# Patient Record
Sex: FEMALE | Race: WHITE | NOT HISPANIC OR LATINO | Employment: FULL TIME | URBAN - METROPOLITAN AREA
[De-identification: names, ages, dates, MRNs, and addresses within clinical notes are randomized per-mention and may not be internally consistent; named-entity substitution may affect disease eponyms.]

---

## 2017-08-14 ENCOUNTER — DOCUMENTATION (OUTPATIENT)
Dept: BARIATRICS/WEIGHT MGMT | Facility: CLINIC | Age: 37
End: 2017-08-14

## 2017-08-14 DIAGNOSIS — R06.00 DYSPNEA, UNSPECIFIED TYPE: Primary | ICD-10-CM

## 2017-08-14 DIAGNOSIS — R10.13 DYSPEPSIA AND DISORDER OF FUNCTION OF STOMACH: ICD-10-CM

## 2017-08-14 DIAGNOSIS — K31.9 DYSPEPSIA AND DISORDER OF FUNCTION OF STOMACH: ICD-10-CM

## 2017-08-14 DIAGNOSIS — R10.13 DYSPEPSIA: ICD-10-CM

## 2017-08-14 DIAGNOSIS — R53.83 FATIGUE, UNSPECIFIED TYPE: ICD-10-CM

## 2017-08-14 RX ORDER — IBUPROFEN 800 MG/1
800 TABLET ORAL EVERY 6 HOURS PRN
COMMUNITY
End: 2018-11-29

## 2017-08-14 RX ORDER — CETIRIZINE HYDROCHLORIDE 10 MG/1
10 TABLET ORAL DAILY
COMMUNITY
End: 2018-11-06

## 2017-08-14 RX ORDER — VENLAFAXINE HYDROCHLORIDE 75 MG/1
75 CAPSULE, EXTENDED RELEASE ORAL DAILY
COMMUNITY
End: 2018-11-06

## 2017-08-14 RX ORDER — OMEPRAZOLE 40 MG/1
20 CAPSULE, DELAYED RELEASE ORAL DAILY
COMMUNITY
End: 2017-09-19 | Stop reason: DRUGHIGH

## 2017-09-19 ENCOUNTER — DOCUMENTATION (OUTPATIENT)
Dept: BARIATRICS/WEIGHT MGMT | Facility: CLINIC | Age: 37
End: 2017-09-19

## 2017-09-19 ENCOUNTER — OFFICE VISIT (OUTPATIENT)
Dept: BARIATRICS/WEIGHT MGMT | Facility: CLINIC | Age: 37
End: 2017-09-19

## 2017-09-19 VITALS
RESPIRATION RATE: 18 BRPM | BODY MASS INDEX: 47.09 KG/M2 | SYSTOLIC BLOOD PRESSURE: 128 MMHG | TEMPERATURE: 97.9 F | WEIGHT: 293 LBS | DIASTOLIC BLOOD PRESSURE: 82 MMHG | OXYGEN SATURATION: 99 % | HEIGHT: 66 IN | HEART RATE: 82 BPM

## 2017-09-19 DIAGNOSIS — R53.83 FATIGUE, UNSPECIFIED TYPE: ICD-10-CM

## 2017-09-19 DIAGNOSIS — R06.00 DYSPNEA, UNSPECIFIED TYPE: ICD-10-CM

## 2017-09-19 DIAGNOSIS — R06.09 DYSPNEA ON EXERTION: ICD-10-CM

## 2017-09-19 DIAGNOSIS — R10.13 DYSPEPSIA: ICD-10-CM

## 2017-09-19 DIAGNOSIS — E66.01 MORBID OBESITY WITH BMI OF 50.0-59.9, ADULT (HCC): ICD-10-CM

## 2017-09-19 DIAGNOSIS — K21.9 GASTROESOPHAGEAL REFLUX DISEASE, ESOPHAGITIS PRESENCE NOT SPECIFIED: ICD-10-CM

## 2017-09-19 DIAGNOSIS — G47.33 OBSTRUCTIVE SLEEP APNEA SYNDROME: Primary | ICD-10-CM

## 2017-09-19 PROCEDURE — 99205 OFFICE O/P NEW HI 60 MIN: CPT | Performed by: PHYSICIAN ASSISTANT

## 2017-09-19 RX ORDER — SODIUM CHLORIDE 9 MG/ML
150 INJECTION, SOLUTION INTRAVENOUS CONTINUOUS
Status: CANCELLED | OUTPATIENT
Start: 2017-09-19

## 2017-09-19 RX ORDER — OMEPRAZOLE 20 MG/1
20 CAPSULE, DELAYED RELEASE ORAL DAILY
COMMUNITY

## 2017-09-19 NOTE — PROGRESS NOTES
[facility] GROUP BARIATRIC SURGERY  [address]  [facility] [address]  [phone]      Patient  Name:  Mayuri Mittal.  :  1980      Date of Visit: 2017      Chief Complaint:  weight gain; unable to maintain weight loss    History of Present Illness:  Mayuri Mittal is a 37 y.o. female who presents today for evaluation, education and consultation regarding weight loss surgery. The patient is interested in sleeve gastrectomy.     Mayuri has been overweight for at least 20 years, has been 35 pounds or more overweight for at least 20 years, has been 100 pounds or more overweight for 17 or more years and started dieting at age 20.      Previous diet attempts include: Low Fat, Calorie Counting, Gisel's Diet, Cabbage Soup, Fasting and Slim Fast.  The most weight Mayuri lost was 30 pounds on Atkins but was only able to maintain that weight loss for 2 months.  Her maximum lifetime weight is 312 pounds.    As above, patient has been overweight for many years, with numerous failed dietary/weight loss attempts.  She now has obesity related comorbidities and as such has decided to pursue weight loss surgery.      Past Medical History:   Diagnosis Date   • Anxiety    • Chronic back pain     prn NSAIDs   • Depression    • Dyspepsia    • Dyspnea on exertion    • Fatigue    • Former smoker     quit    • GERD (gastroesophageal reflux disease)     daily Prilosec   • Joint pain    • Morbid obesity    • Sleep apnea     CPAP advised, semi-compliant     Past Surgical History:   Procedure Laterality Date   •  SECTION  ,    • COLONOSCOPY      to evaluate rectal bleeding, hemorrhoids, o/w unremarkable   • LAPAROSCOPIC CHOLECYSTECTOMY      1 month postpartum for stones, no issues   • LAPAROSCOPIC HYSTERECTOMY      w/ unilateral URSULA, for dysmenorrhea   • MOUTH SURGERY      upper teeth extracted    • WISDOM TOOTH EXTRACTION         No Known  Allergies      Current Outpatient Prescriptions:   •  cetirizine (zyrTEC) 10 MG tablet, Take 10 mg by mouth Daily., Disp: , Rfl:   •  ibuprofen (ADVIL,MOTRIN) 800 MG tablet, Take 800 mg by mouth Every 6 (Six) Hours As Needed for Mild Pain (1-3)., Disp: , Rfl:   •  omeprazole (priLOSEC) 20 MG capsule, Take 20 mg by mouth Daily., Disp: , Rfl:   •  venlafaxine XR (EFFEXOR-XR) 75 MG 24 hr capsule, Take 75 mg by mouth Daily., Disp: , Rfl:     Social History     Social History   • Marital status:      Spouse name: N/A   • Number of children: N/A   • Years of education: N/A     Occupational History   • Not on file.     Social History Main Topics   • Smoking status: Former Smoker     Packs/day: 1.00     Years: 5.00     Quit date: 11/2007   • Smokeless tobacco: Never Used   • Alcohol use No   • Drug use: No   • Sexual activity: Defer     Other Topics Concern   • Not on file     Social History Narrative    Lives in [place], [place] w/ 3 kids.  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     Family History   Problem Relation Age of Onset   • Hypertension Mother    • Sleep apnea Mother    • Obesity Brother    • Diabetes Brother    • Hypertension Brother    • Obesity Maternal Grandmother    • Diabetes Maternal Grandmother    • Hypertension Maternal Grandmother    • Stroke Maternal Grandmother    • Hypertension Maternal Grandfather    • Stroke Paternal Grandmother    • Diabetes Paternal Grandmother    • Hypertension Paternal Grandmother    • Obesity Paternal Grandmother    • Heart disease Paternal Grandmother    • Cancer Paternal Grandfather      bladder       Review of Systems:  Constitutional:  The patient reports fatigue, weight gain and denies fevers and chills.  Cardiovascular:  The patient denies HTN, heart disease and DVT.  Respiratory:  The patient denies asthma and PE.  Gastrointestinal:  The patient reports heartburn and denies liver disease.  Genitourinary:  The patient denies renal insufficiency.    Musculoskeletal:  The patient  reports joint pain and denies fibromyalgia.  Neurological:  The patient denies seizure and stroke.  Psychiatric:  The patient reports anxiety, depression and denies bipolar disorder.  Endocrine:  The patient denies diabetes and thyroid disease.  Hematologic:  The patient denies anemia and bleeding disorder.  Skin:  The patient denies MRSA.    Physical Exam:  Vital Signs:  Weight: (!) 312 lb 8 oz (142 kg)   Body mass index is 50.44 kg/(m^2).  Temp: 97.9 °F (36.6 °C)   Heart Rate: 82   BP: 128/82     Physical Exam   Constitutional: She is oriented to person, place, and time. She appears well-developed and well-nourished.   HENT:   Head: Normocephalic and atraumatic.   Eyes: Conjunctivae are normal. No scleral icterus.   Neck: Neck supple. No thyromegaly present.   Cardiovascular: Normal rate and regular rhythm.    No murmur heard.  Pulmonary/Chest: Effort normal and breath sounds normal. No respiratory distress. She has no wheezes. She has no rales.   Abdominal: Soft. Bowel sounds are normal. She exhibits no distension and no mass. There is no tenderness. No hernia.   scars:  lap lizz, periumbilical x 2, lap hysterectomy   Musculoskeletal: Normal range of motion. She exhibits no edema.   Neurological: She is alert and oriented to person, place, and time. Gait normal.   Skin: Skin is warm and dry. No rash noted.   Psychiatric: She has a normal mood and affect. Judgment normal.   Vitals reviewed.       Patient Active Problem List   Diagnosis   • Fatigue   • Morbid obesity   • Dyspepsia   • Dyspnea on exertion   • GERD (gastroesophageal reflux disease)   • Depression   • Anxiety   • Joint pain   • Chronic back pain   • Former smoker   • Sleep apnea     Assessment:    Mayuri Mittal is a 37 y.o. year old female with medically complicated obesity pursuing sleeve gastrectomy.    Weight loss surgery is deemed medically necessary given the following obesity related comorbidities including sleep apnea, back pain, knee pain  and GERD with current Weight: (!) 312 lb 8 oz (142 kg) and Body mass index is 50.44 kg/(m^2)..    Plan:  The consultation plan and program requirements were reviewed with the patient.  The patient has been advised that a letter of medical support must be obtained from her primary care physician or referring provider. A psychological evaluation will be arranged.  A nutritional evaluation will be performed.  The patient was advised to start a high protein and low carbohydrate diet.  Necessary lifestyle modifications were discussed.  Instructions on how to access ALBA was given to the patient.  ALBA is an internet based educational video that explains the surgical procedure chosen and answers basic questions regarding that procedure.     Preoperative testing will include: CBC, CMP, Fasting Lipids, TSH, H.Pylori, Pulmonary Function Testing, CXR, EKG and EGD     Additional preop clearances required prior to surgery: Cardiac.      Patient understands that bariatric surgery is not cosmetic surgery but rather a tool to help make a lifelong commitment to lifestyle changes including diet, exercise, behavior modifications, and healthy habits.      ARSENIO Kang

## 2017-09-19 NOTE — PROGRESS NOTES
"Weight Loss Surgery  Presurgical Nutrition Assessment     Mayuri Mittal  2017  [id number]  [id number]  1980  female    Surgery desired: Sleeve Gastrectomy    Ht 66\" (167.6 cm); Wt 312.5 # (142 kg); BMI 50.4  Past Medical History:   Diagnosis Date   • Anxiety    • Chronic back pain     prn NSAIDs   • Depression    • Dyspepsia    • Dyspnea on exertion    • Fatigue    • Former smoker     quit    • GERD (gastroesophageal reflux disease)     daily Prilosec   • Joint pain    • Morbid obesity    • Sleep apnea     CPAP advised, semi-compliant     Past Surgical History:   Procedure Laterality Date   •  SECTION  ,    • COLONOSCOPY      to evaluate rectal bleeding, hemorrhoids, o/w unremarkable   • LAPAROSCOPIC CHOLECYSTECTOMY      1 month postpartum for stones, no issues   • LAPAROSCOPIC HYSTERECTOMY      w/ unilateral URSULA, for dysmenorrhea   • MOUTH SURGERY      upper teeth extracted    • WISDOM TOOTH EXTRACTION       No Known Allergies    Current Outpatient Prescriptions:   •  cetirizine (zyrTEC) 10 MG tablet, Take 10 mg by mouth Daily., Disp: , Rfl:   •  ibuprofen (ADVIL,MOTRIN) 800 MG tablet, Take 800 mg by mouth Every 6 (Six) Hours As Needed for Mild Pain (1-3)., Disp: , Rfl:   •  omeprazole (priLOSEC) 20 MG capsule, Take 20 mg by mouth Daily., Disp: , Rfl:   •  venlafaxine XR (EFFEXOR-XR) 75 MG 24 hr capsule, Take 75 mg by mouth Daily., Disp: , Rfl:       Nutrition Assessment    Estimated energy needs:  2125 kcal    Estimated calories for weight loss:  1500 kcal    IBW (Pounds):  155 #        Excess body weight (Pounds):  160 #       Nutrition Recall  24 Hour recall: (B) (L) (D) -  Reviewed and discussed with patient.  Coffee intake very high - three large coffee mugs (est. by pt to be = to 6-8 oz cups) each morning.  Sometimes is not hungry and has coffee only, other times also has bran flakes and milk also.  No lunch. At 3:30 to 4:30 may have a snack such as a " "brownie or another bowl of cereal.  On day record was kept, was very hungry by mid-afternoon and had 5-6 cups bran.  Supper at 7:30-8 was 4 cups of mac & cheese /c hamburger mixed in.  Lastly, she consumed an entire pint of chocolate ice cream at 10 pm.  No soda or sweet tea. Pt stated she was \"so hungry\" by 4:30 pm.  Noted and discussed lack of protein at meals throughout the day as well as large serving sizes.       Exercise  Joined a gym recently - trying to go 3Xs per wk for 30-60 minutes per time.      Education    Provided information packet re. Sleeve Gastrectomy; . Reviewed principles of healthy low carb high protein eating for wt mgt - to be used now until pre-surgery and also /p recovery from surgery.  Explained need for liquid protein supplement or protein powder for /p surgery liquid diet.  Provided resource for choosing preferred one.  Discussed goal setting for improving eating behaviors.    Recommend that team proceed with surgery and follow per protocol.      Nutrition Goals   Dietary Guidelines per information, packet, as described above.  Protein goal:  grams per day   Carb goal: 100-140 grams per day     Exercise Goals  Continue current exercise routine and regularly go to gym as planned.  Add 15-30 minutes of activity per day as tolerated      Belem Roman, SVEN  09/19/2017  3:14 PM  "

## 2017-10-05 ENCOUNTER — APPOINTMENT (OUTPATIENT)
Dept: PREADMISSION TESTING | Facility: HOSPITAL | Age: 37
End: 2017-10-05

## 2017-10-16 ENCOUNTER — APPOINTMENT (OUTPATIENT)
Dept: PREADMISSION TESTING | Facility: HOSPITAL | Age: 37
End: 2017-10-16

## 2018-11-06 ENCOUNTER — DOCUMENTATION (OUTPATIENT)
Dept: BARIATRICS/WEIGHT MGMT | Facility: CLINIC | Age: 38
End: 2018-11-06

## 2018-11-06 ENCOUNTER — OFFICE VISIT (OUTPATIENT)
Dept: BARIATRICS/WEIGHT MGMT | Facility: CLINIC | Age: 38
End: 2018-11-06

## 2018-11-06 ENCOUNTER — OFFICE VISIT (OUTPATIENT)
Dept: PSYCHIATRY | Facility: CLINIC | Age: 38
End: 2018-11-06

## 2018-11-06 VITALS
HEIGHT: 66 IN | BODY MASS INDEX: 47.09 KG/M2 | TEMPERATURE: 97.8 F | WEIGHT: 293 LBS | DIASTOLIC BLOOD PRESSURE: 96 MMHG | RESPIRATION RATE: 18 BRPM | SYSTOLIC BLOOD PRESSURE: 148 MMHG | HEART RATE: 76 BPM | OXYGEN SATURATION: 99 %

## 2018-11-06 DIAGNOSIS — M72.2 PLANTAR FASCIITIS: ICD-10-CM

## 2018-11-06 DIAGNOSIS — E66.01 MORBID OBESITY (HCC): ICD-10-CM

## 2018-11-06 DIAGNOSIS — R06.09 DYSPNEA ON EXERTION: ICD-10-CM

## 2018-11-06 DIAGNOSIS — F41.9 ANXIETY: ICD-10-CM

## 2018-11-06 DIAGNOSIS — M25.50 ARTHRALGIA, UNSPECIFIED JOINT: ICD-10-CM

## 2018-11-06 DIAGNOSIS — R60.0 LOWER EXTREMITY EDEMA: ICD-10-CM

## 2018-11-06 DIAGNOSIS — R53.83 FATIGUE, UNSPECIFIED TYPE: ICD-10-CM

## 2018-11-06 DIAGNOSIS — F41.9 ANXIETY: Primary | ICD-10-CM

## 2018-11-06 DIAGNOSIS — R03.0 ELEVATED BP WITHOUT DIAGNOSIS OF HYPERTENSION: ICD-10-CM

## 2018-11-06 DIAGNOSIS — J30.2 SEASONAL ALLERGIES: ICD-10-CM

## 2018-11-06 DIAGNOSIS — R00.2 PALPITATIONS: Primary | ICD-10-CM

## 2018-11-06 DIAGNOSIS — Z87.891 FORMER SMOKER: ICD-10-CM

## 2018-11-06 DIAGNOSIS — G47.30 SLEEP APNEA, UNSPECIFIED TYPE: ICD-10-CM

## 2018-11-06 DIAGNOSIS — F32.A DEPRESSION, UNSPECIFIED DEPRESSION TYPE: ICD-10-CM

## 2018-11-06 DIAGNOSIS — Z90.49 S/P CHOLECYSTECTOMY: ICD-10-CM

## 2018-11-06 DIAGNOSIS — K21.9 GASTROESOPHAGEAL REFLUX DISEASE, ESOPHAGITIS PRESENCE NOT SPECIFIED: ICD-10-CM

## 2018-11-06 PROCEDURE — 90791 PSYCH DIAGNOSTIC EVALUATION: CPT | Performed by: PSYCHOLOGIST

## 2018-11-06 PROCEDURE — 99214 OFFICE O/P EST MOD 30 MIN: CPT | Performed by: PHYSICIAN ASSISTANT

## 2018-11-06 RX ORDER — DESVENLAFAXINE SUCCINATE 50 MG/1
50 TABLET, EXTENDED RELEASE ORAL DAILY
COMMUNITY

## 2018-11-06 RX ORDER — SODIUM CHLORIDE 9 MG/ML
100 INJECTION, SOLUTION INTRAVENOUS CONTINUOUS
Status: CANCELLED | OUTPATIENT
Start: 2018-11-06

## 2018-11-06 RX ORDER — LORATADINE 10 MG/1
10 TABLET ORAL DAILY
COMMUNITY

## 2018-11-06 RX ORDER — TRAZODONE HYDROCHLORIDE 50 MG/1
50 TABLET ORAL AS NEEDED
COMMUNITY

## 2018-11-06 NOTE — PROGRESS NOTES
[facility] BARIATRIC SURGERY  [address]  [facility] [address]  [phone]      Patient  Name:  Mayuri Mittal  :  1980      Date of Visit: 2018      Chief Complaint:  weight gain; unable to maintain weight loss    History of Present Illness:  Mayuri Mittal is a 38 y.o. female who presents today for evaluation, education and consultation regarding weight loss surgery. The patient is interested in sleeve gastrectomy.     Mayuri has been overweight for at least 20 years, has been 35 pounds or more overweight for at least 20 years, has been 100 pounds or more overweight for 17 or more years and started dieting at age 20.      Previous diet attempts include: Low Fat, Calorie Counting, Gisel's Diet, Cabbage Soup, Fasting, Slim Fast and 21 day fix; None; None.  The most weight Mayuri lost was 30 pounds on atkins but was only able to maintain that weight loss for 2 months.  Her maximum lifetime weight is 335 pounds.    As above, patient has been overweight for many years, with numerous failed dietary/weight loss attempts.  She now has obesity related comorbidities and as such has decided to pursue weight loss surgery.    Brother had LSG with Dr. Bergeron, has done well.   Pursuing WLS to improve overall health, feel better.     GI: daily Prilosec 20mg daily controls symptoms well.   No h/o EGD, h pylori or HH.    S/p lizz 2/2 cholelithiasis. No other GI complaints.     MSK: plantar fasciitis very bothersone at times, ibuprofen prn. Chronic back pain, knees- prn NSAIDS, no injections.    All past medical, surgical, social and family history have been obtained and discussed as pertinent to bariatric surgery as below.     Past Medical History:   Diagnosis Date   • Anxiety    • Chronic back pain     prn NSAIDs   • Depression    • Depression    • Dyspnea on exertion    • Elevated BP without diagnosis of hypertension    • Fatigue    • Former smoker     quit    •  GERD (gastroesophageal reflux disease)     daily Prilosec 20mg daily controls symptoms well.   No h/o EGD, h pylori or HH.       • Joint pain     worse with activity, Feet, knees, lower back. Doing stretches which help. Ibuprofen prn.    • Lower extremity edema    • Morbid obesity (CMS/HCC)    • Palpitations    • Plantar fasciitis     very bothersone at time, ibuprofen prn.    • Rosacea    • S/P cholecystectomy     cholelithiasis   • Seasonal allergies    • Sleep apnea     CPAP advised, semi-compliant- had issues wtih mask breaking out skin, had to get new mask.       Past Surgical History:   Procedure Laterality Date   •  SECTION  ,     transverse   • COLONOSCOPY      to evaluate rectal bleeding, hemorrhoids, o/w unremarkable   • LAPAROSCOPIC CHOLECYSTECTOMY      1 month postpartum for stones, no issues   • LAPAROSCOPIC HYSTERECTOMY      w/ unilateral URSULA, for dysmenorrhea   • LIPOMA EXCISION Left     LUE   • MOUTH SURGERY      upper teeth extracted    • WISDOM TOOTH EXTRACTION         No Known Allergies    Current Outpatient Prescriptions:   •  desvenlafaxine (PRISTIQ) 50 MG 24 hr tablet, Take 50 mg by mouth Daily., Disp: , Rfl:   •  ibuprofen (ADVIL,MOTRIN) 800 MG tablet, Take 800 mg by mouth Every 6 (Six) Hours As Needed for Mild Pain (1-3)., Disp: , Rfl:   •  loratadine (CLARITIN) 10 MG tablet, Take 10 mg by mouth Daily., Disp: , Rfl:   •  metroNIDAZOLE (METROCREAM) 0.75 % cream, Apply  topically to the appropriate area as directed 2 (Two) Times a Day., Disp: , Rfl:   •  omeprazole (priLOSEC) 20 MG capsule, Take 20 mg by mouth Daily., Disp: , Rfl:   •  traZODone (DESYREL) 50 MG tablet, Take 50 mg by mouth Every Night., Disp: , Rfl:     Social History     Social History   • Marital status:      Spouse name: N/A   • Number of children: N/A   • Years of education: N/A     Occupational History   • Not on file.     Social History Main Topics   • Smoking status: Former  Smoker     Packs/day: 1.00     Years: 5.00     Quit date: 11/2007   • Smokeless tobacco: Never Used   • Alcohol use No   • Drug use: No   • Sexual activity: Defer      Comment: no hormones      Other Topics Concern   • Not on file     Social History Narrative    Lives in [place] w/ 3 kids.  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       Family History   Problem Relation Age of Onset   • Hypertension Mother    • Sleep apnea Mother    • Obesity Brother    • Diabetes Brother    • Hypertension Brother    • Obesity Maternal Grandmother    • Diabetes Maternal Grandmother    • Hypertension Maternal Grandmother    • Stroke Maternal Grandmother    • Hypertension Maternal Grandfather    • Stroke Paternal Grandmother    • Diabetes Paternal Grandmother    • Hypertension Paternal Grandmother    • Obesity Paternal Grandmother    • Heart disease Paternal Grandmother    • Cancer Paternal Grandfather         bladder       Review of Systems:  Constitutional:  Reports fatigue, weight gain and denies fevers, chills.  HEENT:  denies headache, ear pain or loss of hearing, blurred or double vision, nasal discharge or sore throat.  seasonal allergies  Cardiovascular:  Reports palpitations, edema and denies HTN, HLD, CAD, Atrial Fib, hx heart disease, heart murmur, hx MI, chest pain, hx DVT.  Respiratory:  Reports dyspnea on exertion, sleep apnea and denies cough , wheezing, asthma, hx PE.  Gastrointestinal:  Reports heartburn, gallbladder issues and denies dysphagia, nausea, vomiting, abdominal pain, IBS, diarrhea, constipation, melena, blood in stool, liver disease, hx pancreatitis.  Genitourinary:  Reports none and denies history of  frequent UTI, incontinence, hematuria, dysuria, polyuria, polydipsia, renal insufficiency, renal failure.    Musculoskeletal:  Reports joint pain, back pain and denies fibromyalgia, arthritis and autoimmune disease.  Neurological:  Reports none and denies headaches, migraines, numbness /tingling, dizziness,  confusion, seizure, stroke.  Psychiatric:  Reports anxiety, depression and denies bipolar disorder, hx suicide attempt, hx self injury, eating disorder.  Endocrine:  Reports none and denies glucose intolerance, diabetes, thyroid disease, gout.  Hematologic:  Reports none and denies anemia, bleeding disorder, hx blood transfusion.  Skin:  Reports rosacea and denies hx MRSA.      Physical Exam:  Vital Signs:  Weight: (!) 149 kg (328 lb 8 oz)   Body mass index is 53.02 kg/m².  Temp: 97.8 °F (36.6 °C)   Heart Rate: 76   BP: 148/96     Physical Exam   Constitutional: She is oriented to person, place, and time. She appears well-developed and well-nourished.   HENT:   Head: Normocephalic.   Mouth/Throat: Oropharynx is clear and moist.   Eyes: EOM are normal.   Neck: Normal range of motion. Neck supple. No thyromegaly present.   Cardiovascular: Normal rate, regular rhythm and normal heart sounds.    Pulmonary/Chest: Effort normal and breath sounds normal. No respiratory distress. She has no wheezes.   Abdominal: Soft. Bowel sounds are normal. She exhibits no distension. There is no tenderness.   Lap scars  Pfannenstiel   Musculoskeletal: Normal range of motion.   Neurological: She is alert and oriented to person, place, and time.   Skin: Skin is warm and dry.   Psychiatric: She has a normal mood and affect. Her behavior is normal. Judgment and thought content normal.   Vitals reviewed.      Patient Active Problem List   Diagnosis   • Fatigue   • Morbid obesity (CMS/HCC)   • Dyspepsia   • Dyspnea on exertion   • GERD (gastroesophageal reflux disease)   • Depression   • Anxiety   • Joint pain   • Chronic back pain   • Former smoker   • Sleep apnea   • Plantar fasciitis   • Rosacea   • Seasonal allergies   • Elevated BP without diagnosis of hypertension   • Palpitations   • Lower extremity edema   • S/P cholecystectomy       Assessment:    Mayuri Mittal is a 38 y.o. year old female with medically complicated obesity  pursuing sleeve gastrectomy.    Weight loss surgery is deemed medically necessary given the following obesity related comorbidities including sleep apnea, back pain, knee pain, GERD, gall bladder disease, edema and depression with current Weight: (!) 149 kg (328 lb 8 oz) and Body mass index is 53.02 kg/m²..    Plan:  The consultation plan and program requirements were reviewed with the patient.  The patient has been advised that a letter of medical support must be obtained from her primary care physician or referring provider. A psychological evaluation will be arranged.  A nutritional evaluation will be performed.  The patient was advised to start a high protein and low carbohydrate diet.  Necessary lifestyle modifications were discussed.  Instructions on how to access ALBA was given to the patient.  ALBA is an internet based educational video that explains the surgical procedure chosen and answers basic questions regarding that procedure.     Preoperative testing will include: CBC, CMP, Lipids, TSH, HgA1C, H.Pylori, Pulmonary Function Testing, CXR, EKG and EGD     Additional preop clearances required prior to surgery: Cardiac.  Will schedule with [facility].     The patient has been educated on expected postoperative lifestyle changes, including commitment to high protein diet, vitamin regimen, and exercise program.  They are aware that support groups are encouraged for optimal weight loss results. Patient understands that bariatric surgery is not cosmetic surgery but rather a tool to help make a lifelong commitment to lifestyle changes including diet, exercise, behavior modifications, and healthy habits. The procedure was discussed with the patient and all questions were answered. The importance of avoiding ASA/ NSAIDS/ steroids/ tobacco/ hormones/ immunomodulators perioperatively was discussed.         Brittany Long PA-C

## 2018-11-07 PROBLEM — K21.9 GASTROESOPHAGEAL REFLUX DISEASE: Status: ACTIVE | Noted: 2018-11-07

## 2018-11-07 LAB
ALBUMIN SERPL-MCNC: 4.2 G/DL (ref 3.5–5.5)
ALBUMIN/GLOB SERPL: 1.4 {RATIO} (ref 1.2–2.2)
ALP SERPL-CCNC: 51 IU/L (ref 39–117)
ALT SERPL-CCNC: 13 IU/L (ref 0–32)
AST SERPL-CCNC: 13 IU/L (ref 0–40)
BASOPHILS # BLD AUTO: 0 X10E3/UL (ref 0–0.2)
BASOPHILS NFR BLD AUTO: 0 %
BILIRUB SERPL-MCNC: 0.3 MG/DL (ref 0–1.2)
BUN SERPL-MCNC: 13 MG/DL (ref 6–20)
BUN/CREAT SERPL: 22 (ref 9–23)
CALCIUM SERPL-MCNC: 9.2 MG/DL (ref 8.7–10.2)
CHLORIDE SERPL-SCNC: 101 MMOL/L (ref 96–106)
CHOLEST SERPL-MCNC: 137 MG/DL (ref 100–199)
CO2 SERPL-SCNC: 23 MMOL/L (ref 20–29)
CREAT SERPL-MCNC: 0.59 MG/DL (ref 0.57–1)
EOSINOPHIL # BLD AUTO: 0.3 X10E3/UL (ref 0–0.4)
EOSINOPHIL NFR BLD AUTO: 3 %
ERYTHROCYTE [DISTWIDTH] IN BLOOD BY AUTOMATED COUNT: 14.5 % (ref 12.3–15.4)
GLOBULIN SER CALC-MCNC: 3.1 G/DL (ref 1.5–4.5)
GLUCOSE SERPL-MCNC: 90 MG/DL (ref 65–99)
HBA1C MFR BLD: 5.5 % (ref 4.8–5.6)
HCT VFR BLD AUTO: 38.2 % (ref 34–46.6)
HDLC SERPL-MCNC: 31 MG/DL
HGB BLD-MCNC: 12.6 G/DL (ref 11.1–15.9)
IMM GRANULOCYTES # BLD: 0 X10E3/UL (ref 0–0.1)
IMM GRANULOCYTES NFR BLD: 0 %
LDLC SERPL CALC-MCNC: 65 MG/DL (ref 0–99)
LYMPHOCYTES # BLD AUTO: 3.4 X10E3/UL (ref 0.7–3.1)
LYMPHOCYTES NFR BLD AUTO: 36 %
MCH RBC QN AUTO: 27.2 PG (ref 26.6–33)
MCHC RBC AUTO-ENTMCNC: 33 G/DL (ref 31.5–35.7)
MCV RBC AUTO: 83 FL (ref 79–97)
MONOCYTES # BLD AUTO: 0.7 X10E3/UL (ref 0.1–0.9)
MONOCYTES NFR BLD AUTO: 7 %
NEUTROPHILS # BLD AUTO: 5.1 X10E3/UL (ref 1.4–7)
NEUTROPHILS NFR BLD AUTO: 54 %
PLATELET # BLD AUTO: 355 X10E3/UL (ref 150–379)
POTASSIUM SERPL-SCNC: 4.6 MMOL/L (ref 3.5–5.2)
PROT SERPL-MCNC: 7.3 G/DL (ref 6–8.5)
RBC # BLD AUTO: 4.63 X10E6/UL (ref 3.77–5.28)
SODIUM SERPL-SCNC: 140 MMOL/L (ref 134–144)
TRIGL SERPL-MCNC: 207 MG/DL (ref 0–149)
TSH SERPL DL<=0.005 MIU/L-ACNC: 1.4 UIU/ML (ref 0.45–4.5)
VLDLC SERPL CALC-MCNC: 41 MG/DL (ref 5–40)
WBC # BLD AUTO: 9.7 X10E3/UL (ref 3.4–10.8)

## 2018-11-08 NOTE — PROGRESS NOTES
Weight Loss Surgery  Presurgical Nutrition Assessment     Mayuri Mittal  2018  [id number]  [id number]  1980  female    Surgery desired: Sleeve Gastrectomy    Weight: (!) 149 kg (328 lb 8 oz)   Body mass index is 53.02 kg/m².  Past Medical History:   Diagnosis Date   • Anxiety    • Chronic back pain     prn NSAIDs   • Depression    • Dyspnea on exertion    • Elevated BP without diagnosis of hypertension    • Fatigue    • Former smoker     quit    • GERD (gastroesophageal reflux disease)     daily Prilosec 20mg daily controls symptoms well.   No h/o EGD, h pylori or HH.       • Joint pain     worse with activity, Feet, knees, lower back. Doing stretches which help. Ibuprofen prn.    • Lower extremity edema    • Morbid obesity (CMS/HCC)    • Palpitations    • Plantar fasciitis     very bothersone at time, ibuprofen prn.    • Rosacea    • S/P cholecystectomy     cholelithiasis   • Seasonal allergies    • Sleep apnea     CPAP advised, semi-compliant- had issues wtih mask breaking out skin, had to get new mask.       Past Surgical History:   Procedure Laterality Date   •  SECTION  ,     transverse   • COLONOSCOPY      to evaluate rectal bleeding, hemorrhoids, o/w unremarkable   • LAPAROSCOPIC CHOLECYSTECTOMY      1 month postpartum for stones, no issues   • LAPAROSCOPIC HYSTERECTOMY      w/ unilateral URSULA, for dysmenorrhea   • LIPOMA EXCISION Left     LUE   • MOUTH SURGERY      upper teeth extracted    • WISDOM TOOTH EXTRACTION       No Known Allergies    Current Outpatient Prescriptions:   •  desvenlafaxine (PRISTIQ) 50 MG 24 hr tablet, Take 50 mg by mouth Daily., Disp: , Rfl:   •  ibuprofen (ADVIL,MOTRIN) 800 MG tablet, Take 800 mg by mouth Every 6 (Six) Hours As Needed for Mild Pain (1-3)., Disp: , Rfl:   •  loratadine (CLARITIN) 10 MG tablet, Take 10 mg by mouth Daily., Disp: , Rfl:   •  metroNIDAZOLE (METROCREAM) 0.75 % cream, Apply  topically to the appropriate  area as directed 2 (Two) Times a Day., Disp: , Rfl:   •  omeprazole (priLOSEC) 20 MG capsule, Take 20 mg by mouth Daily., Disp: , Rfl:   •  traZODone (DESYREL) 50 MG tablet, Take 50 mg by mouth Every Night., Disp: , Rfl:       Nutrition Assessment    Estimated energy needs: 2400    Estimated calories for weight loss: 1600    IBW (Pounds):  160      Excess body weight (Pounds): 168       Nutrition Recall  24 Hour recall: (B) (L) (D) -  Reviewed and discussed with patient       Exercise  Just started walking 10-15 minutes per day.       Education    Provided manual:    Sleeve Gastrectomy    Provided follow-up options for support, including contact information for dietitians here, if desired.  Web-based support information and apps for smart phones and computers given.  Noted that monthly support group is offered at this clinic, and that support is associated with weight loss.    Recommend that team proceed with surgery and follow per protocol.      Nutrition Goals   Dietary Guidelines per manual  Protein goal:  grams per day     Exercise Goals  Continue current exercise routine   Add 15-30 minutes of activity per day as tolerated        Kalpana Barrett, SVEN  11/06/2018  9:14 AM

## 2018-11-09 NOTE — PROGRESS NOTES
PROGRESS NOTE    Data:  Mayuri Mittal is a 38 y.o. female who met with the undersigned for a scheduled individual outpatient therapy session from 3:00 - 3:45pm.      Clinical Maneuvering/Intervention:      Pt talked about struggling with obesity for several years. She stated that she has been thinking about getting weight loss surgery for the past two years. Despite trying different weight loss plans and diets, the pt reported being unsuccessful in losing weight. She reached a turning point whereby her health started to decline including needed to get a cpap machine for sleep apnea, and then she became serious about getting weight loss surgery. A psychological evaluation was conducted in order to assess past and current level of functioning. Areas assessed included, but were not limited to: perception of social support, perception of ability to face and deal with challenges in life (positive functioning), anxiety symptoms, depressive symptoms, perspective on beliefs/belief system, coping skills for stress, intelligence level, addiction issues, etc. Therapeutic rapport was established. Interventions conducted today were geared towards assessing the pt's readiness for weight loss surgery and identifying and psychological contraindications for undergoing such a major life change. Social support was deemed strong (specific to weight loss surgery/weight loss in this manner and in a general sense): brother, mother, and friends. Current psychological struggles were deemed low, but included anxiety problems (at least moderately managed with medication and counseling) and feeling defeated by weight. Stress level was deemed low and related to financial stress and obesity (worrying that her health status will continue to decline). Coping skills for distress and related to undergoing a major life change such as weight loss surgery/weight loss were deemed strong: ability to use resources to meet her needs, strong social  "support system, and believing in herself that she can be successful with weight loss surgery. The pt endorsed having characteristics of readiness to improve quality of life through the major life changes inherent in the journey of weight loss surgery as she could describe in detail how her \"head is in the game\" to take this on. The pt could also articulate a deep sense of purpose in undergoing this major life change by saying that she wants to do it in order to live a longer/higher quality of life and be a role model of maye for her family.          Mental Status Exam  Hygiene:  good  Dress: normal  Attitude:  cooperative and proactive  Motor Activity: normal  Speech: normal  Mood:  levle  Affect:  congruent  Thought Processes: normal  Thought Content:  normal  Suicidal Thoughts:  not endorsed  Homicidal Thoughts:  not endorsed  Crisis Safety Plan: not needed   Hallucinations:  none      Patient's Support Network Includes:  family, friends      Progress toward goal: there is evidence to suggest that she is taking measures to improve the quality of her life including seeking weight loss surgery      Functional Status: moderate      Prognosis: good    Assessment      The pt presented to be suffering from long-term anxiety problems, but that symptoms are at least moderately managed with medication and counseling. She presents as ready, in a psychological sense, to get weight loss surgery and adhere to a proper diet to lose weight. Being overweight likely prevents some anxiety symptoms from remitting as she often worries about her health.    From a psychological standpoint, the pt presents as a good candidate for bariatric surgery.  She is motivated for the surgery, has showed readiness for the lifestyle change in terms of planning to soon adjust her eating habits, and seems to have appropriate expectations of how to prepare and how to live after surgery in order to lose weight successfully.      Plan      In order to " diminish symptoms of distress (primarily anxiety) surrounding obesity, the pt is to follow up with her bariatric surgeon in order to receive weight loss surgery as soon as feasible/appropriate and based on success with compliance to adhering to the proper diet.    Svetlana Corral, PhD, LP

## 2018-11-12 ENCOUNTER — APPOINTMENT (OUTPATIENT)
Dept: PREADMISSION TESTING | Facility: HOSPITAL | Age: 38
End: 2018-11-12

## 2018-11-12 ENCOUNTER — HOSPITAL ENCOUNTER (OUTPATIENT)
Dept: PULMONOLOGY | Facility: HOSPITAL | Age: 38
Discharge: HOME | End: 2018-11-12
Admitting: PHYSICIAN ASSISTANT

## 2018-11-12 DIAGNOSIS — Z87.891 FORMER SMOKER: ICD-10-CM

## 2018-11-12 DIAGNOSIS — R06.09 DYSPNEA ON EXERTION: ICD-10-CM

## 2018-11-12 PROCEDURE — 94010 BREATHING CAPACITY TEST: CPT | Performed by: INTERNAL MEDICINE

## 2018-11-12 PROCEDURE — 94729 DIFFUSING CAPACITY: CPT

## 2018-11-12 PROCEDURE — 94729 DIFFUSING CAPACITY: CPT | Performed by: INTERNAL MEDICINE

## 2018-11-12 PROCEDURE — 94727 GAS DIL/WSHOT DETER LNG VOL: CPT

## 2018-11-12 PROCEDURE — 93010 ELECTROCARDIOGRAM REPORT: CPT | Performed by: INTERNAL MEDICINE

## 2018-11-12 PROCEDURE — 94727 GAS DIL/WSHOT DETER LNG VOL: CPT | Performed by: INTERNAL MEDICINE

## 2018-11-12 PROCEDURE — 93005 ELECTROCARDIOGRAM TRACING: CPT

## 2018-11-12 PROCEDURE — 94010 BREATHING CAPACITY TEST: CPT

## 2018-11-12 NOTE — DISCHARGE INSTRUCTIONS
The following information and instructions were given:    NPO after MN except sips of water with routine prescribed medication (except blood thinner, diabetes, or weight reducing medication) unless otherwise instructed by your physician.  Do not eat, drink, smoke or chew gum after midnight the night before surgery. This also includes no mints.    DO NOT shave for two days before your procedure.  Do not wear makeup.      DO NOT wear fingernail polish (gel/regular) and/or acrylic/artificial nails on the day of surgery.   If a patient had recent manicure and would rather not remove polish or artificial nails, then the minimum requirement is that the polish/artificial nails must be removed from the middle finger on each hand.      If patient is having surgery/procedure on an upper extremity, then the patient was instructed that fingernail polish/artificial fingernails must be removed for surgery.  NO EXCEPTIONS.      If patient is having surgery/procedure on a lower extremity, then the patient was instructed that toenail polish on both extremities must be removed for surgery.  NO EXCEPTIONS.    Remove all jewelry (advised to go to jeweler if unable to remove).  Jewelry, especially rings, can no longer be taped for surgery.    Leave anything you consider valuable at home.    Leave your suitcase in the car until after your surgery.    Bring the following with you (if applicable)       -picture ID and insurance cards       -Co-pay/deductible required by insurance       -Medications in the original bottles (not a list) including all over-the-counter  medications if not brought to PAT       -Copy of advance directive, living will or power of  documents if not  brought to [facility]       -CPAP or BIPAP mask and tubing (do not bring machine)       -Skin prep instructions sheet       -PAT Pass    Education booklet, brochure, handout or binder given to patient.    Pain Control After Surgery handout given to patient.    Signs  and Symptoms of infection discussed.    DVT Prevention education given.  Stressing the importance of ambulation.

## 2018-11-12 NOTE — PAT
Patient instructed to bring CPAP mask and tubing to the hospital for overnight stay.  Explained that it is not necessary to bring their CPAP machine to the hospital instead a CPAP machine will be provided for use by the hospital. If patient knows their CPAP settings, those settings will be implemented.  If not, the CPAP machine will be utilized on the auto setting using their mask and tubing.    Patient verbalized understanding. She has a photo of her setting on her phone, autopap at 6-20    Pt declined AVS for PAT visit.

## 2018-11-14 ENCOUNTER — ANESTHESIA EVENT (OUTPATIENT)
Dept: GASTROENTEROLOGY | Facility: HOSPITAL | Age: 38
End: 2018-11-14

## 2018-11-15 ENCOUNTER — HOSPITAL ENCOUNTER (OUTPATIENT)
Facility: HOSPITAL | Age: 38
Setting detail: HOSPITAL OUTPATIENT SURGERY
Discharge: HOME | End: 2018-11-15
Attending: SURGERY | Admitting: SURGERY

## 2018-11-15 ENCOUNTER — ANESTHESIA (OUTPATIENT)
Dept: GASTROENTEROLOGY | Facility: HOSPITAL | Age: 38
End: 2018-11-15

## 2018-11-15 VITALS
OXYGEN SATURATION: 97 % | DIASTOLIC BLOOD PRESSURE: 67 MMHG | RESPIRATION RATE: 20 BRPM | TEMPERATURE: 97.2 F | SYSTOLIC BLOOD PRESSURE: 122 MMHG | HEART RATE: 80 BPM

## 2018-11-15 DIAGNOSIS — K21.9 GASTROESOPHAGEAL REFLUX DISEASE, ESOPHAGITIS PRESENCE NOT SPECIFIED: ICD-10-CM

## 2018-11-15 PROCEDURE — 88305 TISSUE EXAM BY PATHOLOGIST: CPT | Performed by: SURGERY

## 2018-11-15 PROCEDURE — S0260 H&P FOR SURGERY: HCPCS | Performed by: SURGERY

## 2018-11-15 PROCEDURE — 25010000002 PROPOFOL 10 MG/ML EMULSION: Performed by: NURSE ANESTHETIST, CERTIFIED REGISTERED

## 2018-11-15 PROCEDURE — 43239 EGD BIOPSY SINGLE/MULTIPLE: CPT | Performed by: SURGERY

## 2018-11-15 RX ORDER — SODIUM CHLORIDE 0.9 % (FLUSH) 0.9 %
3-10 SYRINGE (ML) INJECTION AS NEEDED
Status: DISCONTINUED | OUTPATIENT
Start: 2018-11-15 | End: 2018-11-15 | Stop reason: HOSPADM

## 2018-11-15 RX ORDER — FENTANYL CITRATE 50 UG/ML
50 INJECTION, SOLUTION INTRAMUSCULAR; INTRAVENOUS
Status: DISCONTINUED | OUTPATIENT
Start: 2018-11-15 | End: 2018-11-15 | Stop reason: HOSPADM

## 2018-11-15 RX ORDER — SODIUM CHLORIDE 9 MG/ML
100 INJECTION, SOLUTION INTRAVENOUS CONTINUOUS
Status: DISCONTINUED | OUTPATIENT
Start: 2018-11-15 | End: 2018-11-16 | Stop reason: HOSPADM

## 2018-11-15 RX ORDER — ONDANSETRON 2 MG/ML
4 INJECTION INTRAMUSCULAR; INTRAVENOUS ONCE AS NEEDED
Status: DISCONTINUED | OUTPATIENT
Start: 2018-11-15 | End: 2018-11-16 | Stop reason: HOSPADM

## 2018-11-15 RX ORDER — PROPOFOL 10 MG/ML
VIAL (ML) INTRAVENOUS AS NEEDED
Status: DISCONTINUED | OUTPATIENT
Start: 2018-11-15 | End: 2018-11-15 | Stop reason: SURG

## 2018-11-15 RX ORDER — LIDOCAINE HYDROCHLORIDE 10 MG/ML
INJECTION, SOLUTION EPIDURAL; INFILTRATION; INTRACAUDAL; PERINEURAL AS NEEDED
Status: DISCONTINUED | OUTPATIENT
Start: 2018-11-15 | End: 2018-11-15 | Stop reason: SURG

## 2018-11-15 RX ORDER — FAMOTIDINE 10 MG/ML
20 INJECTION, SOLUTION INTRAVENOUS ONCE
Status: COMPLETED | OUTPATIENT
Start: 2018-11-15 | End: 2018-11-15

## 2018-11-15 RX ORDER — PROMETHAZINE HYDROCHLORIDE 25 MG/ML
12.5 INJECTION, SOLUTION INTRAMUSCULAR; INTRAVENOUS ONCE AS NEEDED
Status: DISCONTINUED | OUTPATIENT
Start: 2018-11-15 | End: 2018-11-16 | Stop reason: HOSPADM

## 2018-11-15 RX ORDER — FAMOTIDINE 20 MG/1
20 TABLET, FILM COATED ORAL ONCE
Status: DISCONTINUED | OUTPATIENT
Start: 2018-11-15 | End: 2018-11-15 | Stop reason: HOSPADM

## 2018-11-15 RX ORDER — ONDANSETRON 2 MG/ML
4 INJECTION INTRAMUSCULAR; INTRAVENOUS ONCE AS NEEDED
Status: DISCONTINUED | OUTPATIENT
Start: 2018-11-15 | End: 2018-11-15 | Stop reason: HOSPADM

## 2018-11-15 RX ORDER — SODIUM CHLORIDE, SODIUM LACTATE, POTASSIUM CHLORIDE, CALCIUM CHLORIDE 600; 310; 30; 20 MG/100ML; MG/100ML; MG/100ML; MG/100ML
9 INJECTION, SOLUTION INTRAVENOUS CONTINUOUS
Status: DISCONTINUED | OUTPATIENT
Start: 2018-11-15 | End: 2018-11-16 | Stop reason: HOSPADM

## 2018-11-15 RX ORDER — SODIUM CHLORIDE 0.9 % (FLUSH) 0.9 %
3 SYRINGE (ML) INJECTION EVERY 12 HOURS SCHEDULED
Status: DISCONTINUED | OUTPATIENT
Start: 2018-11-15 | End: 2018-11-15 | Stop reason: HOSPADM

## 2018-11-15 RX ORDER — LIDOCAINE HYDROCHLORIDE 10 MG/ML
0.5 INJECTION, SOLUTION EPIDURAL; INFILTRATION; INTRACAUDAL; PERINEURAL ONCE AS NEEDED
Status: DISCONTINUED | OUTPATIENT
Start: 2018-11-15 | End: 2018-11-15 | Stop reason: HOSPADM

## 2018-11-15 RX ADMIN — PROPOFOL 200 MG: 10 INJECTION, EMULSION INTRAVENOUS at 13:07

## 2018-11-15 RX ADMIN — FAMOTIDINE 20 MG: 10 INJECTION, SOLUTION INTRAVENOUS at 10:45

## 2018-11-15 RX ADMIN — SODIUM CHLORIDE, POTASSIUM CHLORIDE, SODIUM LACTATE AND CALCIUM CHLORIDE 9 ML/HR: 600; 310; 30; 20 INJECTION, SOLUTION INTRAVENOUS at 10:41

## 2018-11-15 RX ADMIN — LIDOCAINE HYDROCHLORIDE 100 MG: 10 INJECTION, SOLUTION EPIDURAL; INFILTRATION; INTRACAUDAL; PERINEURAL at 12:59

## 2018-11-15 RX ADMIN — PROPOFOL 100 MG: 10 INJECTION, EMULSION INTRAVENOUS at 12:59

## 2018-11-15 NOTE — ANESTHESIA POSTPROCEDURE EVALUATION
Patient: Mayuri Mittal    Procedure Summary     Date:  11/15/18 Room / Location:   [facility] /  [facility]    Anesthesia Start:  1256 Anesthesia Stop:  1315    Procedure:  ESOPHAGOGASTRODUODENOSCOPY (N/A ) Diagnosis:       Gastroesophageal reflux disease, esophagitis presence not specified      (Gastroesophageal reflux disease, esophagitis presence not specified [K21.9])    Surgeon:  Laquita Cody MD Provider:  Deb Cornejo MD    Anesthesia Type:  MAC ASA Status:  3          Anesthesia Type: MAC  Last vitals  BP   111/75 (11/15/18 1314)   Temp   97.1 °F (36.2 °C) (11/15/18 1314)   Pulse   96 (11/15/18 1314)   Resp   18 (11/15/18 1314)     SpO2   95 % (11/15/18 1314)     Post Anesthesia Care and Evaluation    Patient location during evaluation: PACU  Patient participation: complete - patient participated  Level of consciousness: awake and alert  Pain score: 0  Pain management: adequate  Airway patency: patent  Anesthetic complications: No anesthetic complications  PONV Status: none  Cardiovascular status: hemodynamically stable and acceptable  Respiratory status: nonlabored ventilation, acceptable and nasal cannula  Hydration status: acceptable

## 2018-11-15 NOTE — H&P
[facility] BARIATRIC SURGERY    [address]    [facility] [address]    [phone]              Patient  Name:  Mayuri Mittal    :  1980              Date of Visit: 2018              Chief Complaint:  weight gain; unable to maintain weight loss         History of Present Illness:  Mayuri Mittal is a 38 y.o. female who presents today for evaluation, education and consultation regarding weight loss surgery. The patient is interested in sleeve gastrectomy.            Mayuri has been overweight for at least 20 years, has been 35 pounds or more overweight for at least 20 years, has been 100 pounds or more overweight for 17 or more years and started dieting at age 20.           Previous diet attempts include: Low Fat, Calorie Counting, Gisel's Diet, Cabbage Soup, Fasting, Slim Fast and 21 day fix; None; None.  The most weight Mayuri lost was 30 pounds on atkins but was only able to maintain that weight loss for 2 months.  Her maximum lifetime weight is 335 pounds.         As above, patient has been overweight for many years, with numerous failed dietary/weight loss attempts.  She now has obesity related comorbidities and as such has decided to pursue weight loss surgery.         Brother had LSG with Dr. Bergeron, has done well.   Pursuing WLS to improve overall health, feel better.       GI: daily Prilosec 20mg daily controls symptoms well.   No h/o EGD, h pylori or HH.    S/p lizz 2/2 cholelithiasis. No other GI complaints.          MSK: plantar fasciitis very bothersone at times, ibuprofen prn. Chronic back pain, knees- prn NSAIDS, no injections.         All past medical, surgical, social and family history have been obtained and discussed as pertinent to bariatric surgery as below.          Medical History              Past Medical History:       Diagnosis     Date       •     Anxiety             •     Chronic back pain                   prn NSAIDs       •      Depression             •     Depression             •     Dyspnea on exertion             •     Elevated BP without diagnosis of hypertension             •     Fatigue             •     Former smoker                   quit        •     GERD (gastroesophageal reflux disease)                   daily Prilosec 20mg daily controls symptoms well.   No h/o EGD, h pylori or HH.           •     Joint pain                   worse with activity, Feet, knees, lower back. Doing stretches which help. Ibuprofen prn.        •     Lower extremity edema             •     Morbid obesity (CMS/HCC)             •     Palpitations             •     Plantar fasciitis                   very bothersone at time, ibuprofen prn.        •     Rosacea             •     S/P cholecystectomy                   cholelithiasis       •     Seasonal allergies             •     Sleep apnea                   CPAP advised, semi-compliant- had issues [facility] mask breaking out skin, had to get new mask.                 Surgical History               Past Surgical History:       Procedure     Laterality     Date       •      SECTION           ,              transverse       •     COLONOSCOPY                        to evaluate rectal bleeding, hemorrhoids, o/w unremarkable       •     LAPAROSCOPIC CHOLECYSTECTOMY                        1 month postpartum for stones, no issues       •     LAPAROSCOPIC HYSTERECTOMY                        w/ unilateral URSULA, for dysmenorrhea       •     LIPOMA EXCISION     Left                   LUE       •     MOUTH SURGERY                        upper teeth extracted        •     WISDOM TOOTH EXTRACTION                               No Known Allergies         Current Outpatient Prescriptions:     •  desvenlafaxine (PRISTIQ) 50 MG 24 hr tablet, Take 50 mg by mouth Daily., Disp: , Rfl:     •  ibuprofen (ADVIL,MOTRIN) 800 MG tablet, Take 800 mg by mouth Every 6 (Six) Hours As Needed for Mild  Pain (1-3)., Disp: , Rfl:     •  loratadine (CLARITIN) 10 MG tablet, Take 10 mg by mouth Daily., Disp: , Rfl:     •  metroNIDAZOLE (METROCREAM) 0.75 % cream, Apply  topically to the appropriate area as directed 2 (Two) Times a Day., Disp: , Rfl:     •  omeprazole (priLOSEC) 20 MG capsule, Take 20 mg by mouth Daily., Disp: , Rfl:     •  traZODone (DESYREL) 50 MG tablet, Take 50 mg by mouth Every Night., Disp: , Rfl:              Social History                         Social History       •     Marital status:                        Spouse name:     N/A       •     Number of children:     N/A       •     Years of education:     N/A                  Occupational History       •     Not on file.                     Social History Main Topics       •     Smoking status:     Former Smoker                   Packs/day:     1.00                   Years:     5.00                   Quit date:     11/2007       •     Smokeless tobacco:     Never Used       •     Alcohol use     No       •     Drug use:     No       •     Sexual activity:     Defer                         Comment: no hormones                    Other Topics     Concern       •     Not on file                  Social History Narrative             Lives in [facility] w/ 3 kids.  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                         Family History       Problem     Relation     Age of Onset       •     Hypertension     Mother             •     Sleep apnea     Mother             •     Obesity     Brother             •     Diabetes     Brother             •     Hypertension     Brother             •     Obesity     Maternal Grandmother             •     Diabetes     Maternal Grandmother             •     Hypertension     Maternal Grandmother             •     Stroke     Maternal Grandmother             •     Hypertension     Maternal Grandfather             •     Stroke     Paternal Grandmother             •     Diabetes     Paternal Grandmother              •     Hypertension     Paternal Grandmother             •     Obesity     Paternal Grandmother             •     Heart disease     Paternal Grandmother             •     Cancer     Paternal Grandfather                       bladder                 Review of Systems:    Constitutional:  Reports fatigue, weight gain and denies fevers, chills.    HEENT:  denies headache, ear pain or loss of hearing, blurred or double vision, nasal discharge or sore throat.    seasonal allergies    Cardiovascular:  Reports palpitations, edema and denies HTN, HLD, CAD, Atrial Fib, hx heart disease, heart murmur, hx MI, chest pain, hx DVT.    Respiratory:  Reports dyspnea on exertion, sleep apnea and denies cough , wheezing, asthma, hx PE.    Gastrointestinal:  Reports heartburn, gallbladder issues and denies dysphagia, nausea, vomiting, abdominal pain, IBS, diarrhea, constipation, melena, blood in stool, liver disease, hx pancreatitis.    Genitourinary:  Reports none and denies history of  frequent UTI, incontinence, hematuria, dysuria, polyuria, polydipsia, renal insufficiency, renal failure.      Musculoskeletal:  Reports joint pain, back pain and denies fibromyalgia, arthritis and autoimmune disease.    Neurological:  Reports none and denies headaches, migraines, numbness /tingling, dizziness, confusion, seizure, stroke.    Psychiatric:  Reports anxiety, depression and denies bipolar disorder, hx suicide attempt, hx self injury, eating disorder.    Endocrine:  Reports none and denies glucose intolerance, diabetes, thyroid disease, gout.    Hematologic:  Reports none and denies anemia, bleeding disorder, hx blood transfusion.    Skin:  Reports rosacea and denies hx MRSA.              Physical Exam:    Vital Signs:    Weight: (!) 149 kg (328 lb 8 oz)     Body mass index is 53.02 kg/m².    Temp: 97.8 °F (36.6 °C)     Heart Rate: 76     BP: 148/96          Physical Exam     Constitutional: She is oriented to person, place, and time.  She appears well-developed and well-nourished.     HENT:     Head: Normocephalic.     Mouth/Throat: Oropharynx is clear and moist.     Eyes: EOM are normal.     Neck: Normal range of motion. Neck supple. No thyromegaly present.     Cardiovascular: Normal rate, regular rhythm and normal heart sounds.      Pulmonary/Chest: Effort normal and breath sounds normal. No respiratory distress. She has no wheezes.     Abdominal: Soft. Bowel sounds are normal. She exhibits no distension. There is no tenderness.   Lap scars  Pfannenstiel   Musculoskeletal: Normal range of motion.   Neurological: She is alert and oriented to person, place, and time.     Skin: Skin is warm and dry.   Psychiatric: She has a normal mood and affect. Her behavior is normal. Judgment and thought content normal.     Vitals reviewed.                    Patient Active Problem List       Diagnosis       •     Fatigue       •     Morbid obesity (CMS/HCC)       •     Dyspepsia       •     Dyspnea on exertion       •     GERD (gastroesophageal reflux disease)       •     Depression       •     Anxiety       •     Joint pain       •     Chronic back pain       •     Former smoker       •     Sleep apnea       •     Plantar fasciitis       •     Rosacea       •     Seasonal allergies       •     Elevated BP without diagnosis of hypertension       •     Palpitations       •     Lower extremity edema       •     S/P cholecystectomy                 Assessment:         Mayuri Mittal is a 38 y.o. year old female with medically complicated obesity pursuing sleeve gastrectomy.         Weight loss surgery is deemed medically necessary given the following obesity related comorbidities including sleep apnea, back pain, knee pain, GERD, gall bladder disease, edema and depression with current Weight: (!) 149 kg (328 lb 8 oz) and Body mass index is 53.02 kg/m²..         Plan:  The consultation plan and program requirements were reviewed with the patient.  The patient  has been advised that a letter of medical support must be obtained from her primary care physician or referring provider. A psychological evaluation will be arranged.  A nutritional evaluation will be performed.  The patient was advised to start a high protein and low carbohydrate diet.  Necessary lifestyle modifications were discussed.  Instructions on how to access ALBA was given to the patient.  ALBA is an internet based educational video that explains the surgical procedure chosen and answers basic questions regarding that procedure.          Preoperative testing will include: CBC, CMP, Lipids, TSH, HgA1C, H.Pylori, Pulmonary Function Testing, CXR, EKG and EGD          Additional preop clearances required prior to surgery: Cardiac.  Will schedule with [facility].          The patient has been educated on expected postoperative lifestyle changes, including commitment to high protein diet, vitamin regimen, and exercise program.  They are aware that support groups are encouraged for optimal weight loss results. Patient understands that bariatric surgery is not cosmetic surgery but rather a tool to help make a lifelong commitment to lifestyle changes including diet, exercise, behavior modifications, and healthy habits. The procedure was discussed with the patient and all questions were answered. The importance of avoiding ASA/ NSAIDS/ steroids/ tobacco/ hormones/ immunomodulators perioperatively was discussed.                    Brittany Long PA-C                    11/15/18    The patient has been seen and examined by me today. There are no changes to the H&P.    GERD with symptoms controlled on daily PPI.  No prior EGD.

## 2018-11-15 NOTE — ANESTHESIA PREPROCEDURE EVALUATION
Anesthesia Evaluation     Patient summary reviewed and Nursing notes reviewed   NPO Solid Status: > 8 hours  NPO Liquid Status: > 8 hours           Airway   Mallampati: I  TM distance: >3 FB  Neck ROM: full  No difficulty expected  Dental    (+) upper dentures    Pulmonary    (+) a smoker Former, shortness of breath, sleep apnea,   Asthma: PEREZ.  Cardiovascular     ECG reviewed    (+) dysrhythmias (Occas iireg when anxiuos),   (-) past MI, angina, cardiac stents, CABG    ROS comment: EKG Normal sinus rhythm   Left axis deviation    Neuro/Psych  (-) seizures, CVA  GI/Hepatic/Renal/Endo    (+) morbid obesity, GERD,    (-) liver disease, no renal disease, diabetes, hypothyroidism    Musculoskeletal     Abdominal    Substance History      OB/GYN          Other                      Anesthesia Plan    ASA 3     MAC     intravenous induction   Anesthetic plan, all risks, benefits, and alternatives have been provided, discussed and informed consent has been obtained with: patient.    Plan discussed with CRNA.

## 2018-11-15 NOTE — DISCHARGE INSTRUCTIONS
General Anesthesia, Adult, Care After  These instructions provide you with information about caring for yourself after your procedure. Your health care provider may also give you more specific instructions. Your treatment has been planned according to current medical practices, but problems sometimes occur. Call your health care provider if you have any problems or questions after your procedure.  What can I expect after the procedure?  After the procedure, it is common to have:  · Vomiting.  · A sore throat.  · Mental slowness.    It is common to feel:  · Nauseous.  · Cold or shivery.  · Sleepy.  · Tired.  · Sore or achy, even in parts of your body where you did not have surgery.    Follow these instructions at home:  For at least 24 hours after the procedure:  · Do not:  ? Participate in activities where you could fall or become injured.  ? Drive.  ? Use heavy machinery.  ? Drink alcohol.  ? Take sleeping pills or medicines that cause drowsiness.  ? Make important decisions or sign legal documents.  ? Take care of children on your own.  · Rest.  Eating and drinking  · If you vomit, drink water, juice, or soup when you can drink without vomiting.  · Drink enough fluid to keep your urine clear or pale yellow.  · Make sure you have little or no nausea before eating solid foods.  · Follow the diet recommended by your health care provider.  General instructions  · Have a responsible adult stay with you until you are awake and alert.  · Return to your normal activities as told by your health care provider. Ask your health care provider what activities are safe for you.  · Take over-the-counter and prescription medicines only as told by your health care provider.  · If you smoke, do not smoke without supervision.  · Keep all follow-up visits as told by your health care provider. This is important.  Contact a health care provider if:  · You continue to have nausea or vomiting at home, and medicines are not helpful.  · You  cannot drink fluids or start eating again.  · You cannot urinate after 8-12 hours.  · You develop a skin rash.  · You have fever.  · You have increasing redness at the site of your procedure.  Get help right away if:  · You have difficulty breathing.  · You have chest pain.  · You have unexpected bleeding.  · You feel that you are having a life-threatening or urgent problem.  This information is not intended to replace advice given to you by your health care provider. Make sure you discuss any questions you have with your health care provider.  Document Released: 03/26/2002 Document Revised: 05/22/2017 Document Reviewed: 12/01/2016  [employer] Interactive Patient Education © 2018 [employer]  Esophagogastroduodenoscopy, Care After  Refer to this sheet in the next few weeks. These instructions provide you with information about caring for yourself after your procedure. Your health care provider may also give you more specific instructions. Your treatment has been planned according to current medical practices, but problems sometimes occur. Call your health care provider if you have any problems or questions after your procedure.  What can I expect after the procedure?  After the procedure, it is common to have:  · A sore throat.  · Nausea.  · Bloating.  · Dizziness.  · Fatigue.    Follow these instructions at home:  · Do not eat or drink anything until the numbing medicine (local anesthetic) has worn off and your gag reflex has returned. You will know that the local anesthetic has worn off when you can swallow comfortably.  · Do not drive for 24 hours if you received a medicine to help you relax (sedative).  · If your health care provider took a tissue sample for testing during the procedure, make sure to get your test results. This is your responsibility. Ask your health care provider or the department performing the test when your results will be ready.  · Keep all follow-up visits as told by your health care  provider. This is important.  Contact a health care provider if:  · You cannot stop coughing.  · You are not urinating.  · You are urinating less than usual.  Get help right away if:  · You have trouble swallowing.  · You cannot eat or drink.  · You have throat or chest pain that gets worse.  · You are dizzy or light-headed.  · You faint.  · You have nausea or vomiting.  · You have chills.  · You have a fever.  · You have severe abdominal pain.  · You have black, tarry, or bloody stools.  This information is not intended to replace advice given to you by your health care provider. Make sure you discuss any questions you have with your health care provider.  Document Released: 12/04/2013 Document Revised: 05/25/2017 Document Reviewed: 11/10/2016  [employer] Interactive Patient Education © 2018 [employer]

## 2018-11-15 NOTE — OP NOTE
PROCEDURE NOTE.    Date: 11/15/18    Patient: Mayuri Mittal     Surgeon: Laquita Cody MD      Preoperative Diagnosis: GERD     Postoperative Diagnosis: GERD, hiatal hernia, esophagitis     Procedure Performed: Esophagogastroduodenoscopy with biopsy (CPT 96623)    Findings: GE junction at 38 cm, esophageal biopsy taken at 36 cm.  +HH.  Area of esophagitis vs. Short segment Antony's biopsied at 36 cm.     Specimens: Distal esophageal and antral biopsies     Indications: Mayuri Mittal is a 38 y.o. year old supermorbidly obese female who is preparing for sleeve gastrectomy.  The patient has reflux that is controlled on daily PPI.  She presents today for diagnostic endoscopy.       Procedure:      After informed consent was taken, the patient was brought to the operating room and placed in the supine position. MAC was given by anesthesia staff. A bite block was placed and time out performed. A flexible endoscope was passed transorally down to the second portion of the duodenum without difficulty. The scope was slowly withdrawn and the anatomy examined with photodocumentation throughout. The duodenum was normal. The pylorus was without spasm. The antrum of the stomach was without significant gastritis. A biopsy was taken to rule out H. Pylori. The scope was retroflexed and the body, fundus, and cardia were examined. There was no abnormality and no hiatal hernia seen from this angle. The scope was withdrawn back into the esophagus after decompressing the stomach. The GE junction was at 38 cm and the distal esophagus showed evidence of reflux esophagitis vs. Antony's esophagus with a few salmon colored tongues/islands of tissue that extended no further than 2 cm from GE junction. Biopsy was taken. There was a sliding hiatal hernia. The scope was further withdrawn. There was no other esophageal abnormality. The vocal cords were normal. The scope was withdrawn completely and the procedure concluded. The  patient was awakened and taken to recovery in good condition.      Recommendations: We will discuss biopsy results at next office appointment.

## 2018-11-18 LAB
CYTO UR: NORMAL
LAB AP CASE REPORT: NORMAL
LAB AP CLINICAL INFORMATION: NORMAL
PATH REPORT.FINAL DX SPEC: NORMAL
PATH REPORT.GROSS SPEC: NORMAL

## 2018-11-29 ENCOUNTER — CONSULT (OUTPATIENT)
Dept: CARDIOLOGY | Facility: CLINIC | Age: 38
End: 2018-11-29

## 2018-11-29 VITALS
HEIGHT: 66 IN | HEART RATE: 86 BPM | WEIGHT: 293 LBS | DIASTOLIC BLOOD PRESSURE: 64 MMHG | SYSTOLIC BLOOD PRESSURE: 102 MMHG | BODY MASS INDEX: 47.09 KG/M2 | OXYGEN SATURATION: 96 %

## 2018-11-29 DIAGNOSIS — E78.5 DYSLIPIDEMIA: ICD-10-CM

## 2018-11-29 DIAGNOSIS — Z01.810 PREOP CARDIOVASCULAR EXAM: Primary | ICD-10-CM

## 2018-11-29 PROCEDURE — 99214 OFFICE O/P EST MOD 30 MIN: CPT | Performed by: INTERNAL MEDICINE

## 2018-11-29 RX ORDER — BUSPIRONE HYDROCHLORIDE 30 MG/1
1 TABLET ORAL 2 TIMES DAILY
COMMUNITY
Start: 2018-11-26

## 2018-11-29 RX ORDER — IBUPROFEN 200 MG
600 TABLET ORAL EVERY 6 HOURS PRN
COMMUNITY

## 2018-11-29 NOTE — PROGRESS NOTES
[facility]  Consultation H&P  Mayuri Mittal  1980  [unfilled]  [unfilled]  VISIT DATE:  18    PCP: Zeynep Haro, ANITHA  [address]  [place] [id number]    IDENTIFICATION: A 38 y.o. female      CC:  Chief Complaint   Patient presents with   • Irregular Heart Beat   • Dizziness   • Shortness of Breath   • Edema   • Surgical Clearance     gastric sleeve       PROBLEM LIST:  1. HLD  1. 18   HDL 31 LDL 65  2. Morbid obesity  3. Former tobacco abuse  1. Cessation   4. Sleep apnea  1. compliant with CPAP  5. Chronic fatigue  6. Dyspnea on exertion  7. Chronic back pain  8. NSAID use  9. Depression/anxiety/PTSD  10. Joint pain  11. GERD  12.   13. Surgical history:  1.  ×2  2. Cholecystectomy  3. Hysterectomy   4. Oral surgery ×2    Allergies  No Known Allergies    Current Medications    Current Outpatient Medications:   •  busPIRone (BUSPAR) 30 MG tablet, Take 1 tablet by mouth 2 (Two) Times a Day., Disp: , Rfl:   •  desvenlafaxine (PRISTIQ) 50 MG 24 hr tablet, Take 50 mg by mouth Daily., Disp: , Rfl:   •  ibuprofen (ADVIL,MOTRIN) 200 MG tablet, Take 600 mg by mouth Every 6 (Six) Hours As Needed for Mild Pain ., Disp: , Rfl:   •  loratadine (CLARITIN) 10 MG tablet, Take 10 mg by mouth Daily., Disp: , Rfl:   •  metroNIDAZOLE (METROCREAM) 0.75 % cream, Apply  topically to the appropriate area as directed 2 (Two) Times a Day., Disp: , Rfl:   •  Multiple Vitamins-Calcium (ONE-A-DAY WOMENS FORMULA PO), Take 1 tablet/day by mouth Daily., Disp: , Rfl:   •  omeprazole (priLOSEC) 20 MG capsule, Take 20 mg by mouth Daily., Disp: , Rfl:   •  Salicylic Acid 2 % cream, Apply 1 application topically Daily As Needed. To face, Disp: , Rfl:   •  traZODone (DESYREL) 50 MG tablet, Take 50 mg by mouth As Needed., Disp: , Rfl:      History of Present Illness   HPI  This is a 38-year-old female with the above-mentioned PMH who presents for  "consult for evaluation of cardiac clearance for sleeve gastrectomy.  She states she has \"always\" felt palpitations described as nonsustained \"flip flops\" that occur a few times a week, usually with stress, with no associated symptoms.     Pt denies any chest pain, dyspnea at rest, dyspnea on exertion, orthopnea, PND, lower extremity edema, or claudication. Pt denies history of CHF, DVT, PE, MI, CVA, TIA, or rheumatic fever. No family hx early onset CAD, aneurysm, sudden cardiac death.       ROS  Review of Systems   Constitution: Positive for malaise/fatigue.   HENT: Positive for tinnitus.    Cardiovascular: Positive for dyspnea on exertion, leg swelling and palpitations.   Respiratory: Positive for shortness of breath, sleep disturbances due to breathing and snoring.    Musculoskeletal: Positive for myalgias.   Gastrointestinal: Positive for heartburn.   Neurological: Positive for excessive daytime sleepiness, dizziness, headaches and light-headedness.   Psychiatric/Behavioral: Positive for depression and memory loss. The patient is nervous/anxious.    All other systems reviewed and are negative.      SOCIAL HX  Social History     Socioeconomic History   • Marital status:      Spouse name: Not on file   • Number of children: Not on file   • Years of education: Not on file   • Highest education level: Not on file   Social Needs   • Financial resource strain: Not on file   • Food insecurity - worry: Not on file   • Food insecurity - inability: Not on file   • Transportation needs - medical: Not on file   • Transportation needs - non-medical: Not on file   Occupational History   • Not on file   Tobacco Use   • Smoking status: Former Smoker     Packs/day: 1.00     Years: 10.00     Pack years: 10.00     Last attempt to quit: 2007     Years since quittin.0   • Smokeless tobacco: Never Used   Substance and Sexual Activity   • Alcohol use: No   • Drug use: No   • Sexual activity: Defer     Comment: no " "hormones    Other Topics Concern   • Not on file   Social History Narrative    Lives in [facility] w/ 3 kids.  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         FAMILY HX  Family History   Problem Relation Age of Onset   • Hypertension Mother    • Alcohol abuse Father    • Obesity Brother    • Diabetes Brother    • Hypertension Brother    • Obesity Maternal Grandmother    • Diabetes Maternal Grandmother    • Hypertension Maternal Grandmother    • Stroke Maternal Grandmother    • Hypertension Maternal Grandfather    • Stroke Paternal Grandmother    • Diabetes Paternal Grandmother    • Hypertension Paternal Grandmother    • Obesity Paternal Grandmother    • Heart disease Paternal Grandmother    • Cancer Paternal Grandfather         bladder   • Obesity Brother    • Diabetes Brother    • Drug abuse Brother        Vitals:    11/29/18 1302   BP: 102/64   BP Location: Left arm   Patient Position: Sitting   Pulse: 86   SpO2: 96%   Weight: (!) 151 kg (332 lb)   Height: 167.6 cm (66\")       PHYSICAL EXAMINATION:  Physical Exam   Constitutional: She is oriented to person, place, and time. She appears well-developed and well-nourished. No distress.   obese   HENT:   Head: Normocephalic and atraumatic.   Right Ear: External ear normal.   Left Ear: External ear normal.   Nose: Nose normal.   Eyes: Conjunctivae and EOM are normal.   Neck: Neck supple. No hepatojugular reflux and no JVD present. Carotid bruit is not present. No thyromegaly present.   Cardiovascular: Normal rate, regular rhythm, S1 normal, S2 normal, normal heart sounds, intact distal pulses and normal pulses. Exam reveals no gallop, no distant heart sounds and no midsystolic click.   No murmur heard.  Pulses:       Radial pulses are 2+ on the right side, and 2+ on the left side.        Dorsalis pedis pulses are 2+ on the right side, and 2+ on the left side.        Posterior tibial pulses are 2+ on the right side, and 2+ on the left side.   Pulmonary/Chest: Effort normal and " breath sounds normal. No respiratory distress. She has no decreased breath sounds. She has no wheezes. She has no rhonchi. She has no rales.   Abdominal: Soft. Bowel sounds are normal. There is no hepatosplenomegaly. There is no tenderness.   Musculoskeletal: Normal range of motion. She exhibits no edema.   Neurological: She is alert and oriented to person, place, and time.   No focal deficits.   Skin: Skin is warm and dry. No erythema.   Psychiatric: She has a normal mood and affect. Thought content normal.   Nursing note and vitals reviewed.      Diagnostic Data:  Procedures  Lab Results   Component Value Date    CHLPL 137 11/06/2018    TRIG 207 (H) 11/06/2018    HDL 31 (L) 11/06/2018     Lab Results   Component Value Date    BUN 13 11/06/2018    CREATININE 0.59 11/06/2018     11/06/2018    K 4.6 11/06/2018     11/06/2018    CO2 23 11/06/2018     Lab Results   Component Value Date    HGBA1C 5.5 11/06/2018     Lab Results   Component Value Date    HGB 12.6 11/06/2018    HCT 38.2 11/06/2018     11/06/2018       ASSESSMENT:   Diagnosis Plan   1. Preop cardiovascular exam     2. Dyslipidemia         PLAN:  1. Pt acceptable cardiac risk for sleeve gastrectomy  2. Pt counseled that elevated TGs can be an early sign of insulin resistance.      Scribed for Henri Norris MD by Rosy Roberson PA-C. 11/29/2018  2:04 PM   IHenri MD, personally performed the services described in this documentation as scribed by the above named individual in my presence, and it is both accurate and complete.  11/29/2018  2:04 PM    Henri Norris MD, [facility]

## 2018-12-06 ENCOUNTER — RESULTS ENCOUNTER (OUTPATIENT)
Dept: BARIATRICS/WEIGHT MGMT | Facility: CLINIC | Age: 38
End: 2018-12-06

## 2018-12-06 DIAGNOSIS — M25.50 ARTHRALGIA, UNSPECIFIED JOINT: ICD-10-CM

## 2018-12-06 DIAGNOSIS — F32.A DEPRESSION, UNSPECIFIED DEPRESSION TYPE: ICD-10-CM

## 2018-12-06 DIAGNOSIS — K21.9 GASTROESOPHAGEAL REFLUX DISEASE, ESOPHAGITIS PRESENCE NOT SPECIFIED: ICD-10-CM

## 2018-12-06 DIAGNOSIS — R60.0 LOWER EXTREMITY EDEMA: ICD-10-CM

## 2018-12-06 DIAGNOSIS — J30.2 SEASONAL ALLERGIES: ICD-10-CM

## 2018-12-06 DIAGNOSIS — G47.30 SLEEP APNEA, UNSPECIFIED TYPE: ICD-10-CM

## 2018-12-06 DIAGNOSIS — Z87.891 FORMER SMOKER: ICD-10-CM

## 2018-12-06 DIAGNOSIS — R06.09 DYSPNEA ON EXERTION: ICD-10-CM

## 2018-12-06 DIAGNOSIS — R53.83 FATIGUE, UNSPECIFIED TYPE: ICD-10-CM

## 2018-12-06 DIAGNOSIS — R03.0 ELEVATED BP WITHOUT DIAGNOSIS OF HYPERTENSION: ICD-10-CM

## 2018-12-06 DIAGNOSIS — E66.01 MORBID OBESITY (HCC): ICD-10-CM

## 2018-12-06 DIAGNOSIS — Z90.49 S/P CHOLECYSTECTOMY: ICD-10-CM

## 2018-12-06 DIAGNOSIS — F41.9 ANXIETY: ICD-10-CM

## 2018-12-06 DIAGNOSIS — R00.2 PALPITATIONS: ICD-10-CM

## 2018-12-06 DIAGNOSIS — M72.2 PLANTAR FASCIITIS: ICD-10-CM

## 2018-12-10 ENCOUNTER — LAB (OUTPATIENT)
Dept: LAB | Facility: HOSPITAL | Age: 38
End: 2018-12-10

## 2018-12-10 ENCOUNTER — TRANSCRIBE ORDERS (OUTPATIENT)
Dept: LAB | Facility: HOSPITAL | Age: 38
End: 2018-12-10

## 2018-12-10 DIAGNOSIS — E66.01 MORBID OBESITY (HCC): ICD-10-CM

## 2018-12-10 DIAGNOSIS — R00.2 PALPITATIONS: Primary | ICD-10-CM

## 2018-12-10 DIAGNOSIS — R06.89 HYPOVENTILATION, IDIOPATHIC: ICD-10-CM

## 2018-12-10 PROCEDURE — 87338 HPYLORI STOOL AG IA: CPT

## 2018-12-15 LAB — H PYLORI AG STL QL IA: POSITIVE

## 2018-12-17 ENCOUNTER — TELEPHONE (OUTPATIENT)
Dept: BARIATRICS/WEIGHT MGMT | Facility: CLINIC | Age: 38
End: 2018-12-17

## 2018-12-18 RX ORDER — CLARITHROMYCIN 500 MG/1
500 TABLET, COATED ORAL 2 TIMES DAILY
Qty: 20 TABLET | Refills: 0 | Status: SHIPPED | OUTPATIENT
Start: 2018-12-18 | End: 2018-12-28

## 2018-12-18 RX ORDER — AMOXICILLIN 500 MG/1
1000 CAPSULE ORAL 2 TIMES DAILY
Qty: 40 CAPSULE | Refills: 0 | Status: SHIPPED | OUTPATIENT
Start: 2018-12-18 | End: 2018-12-28

## 2018-12-18 RX ORDER — OMEPRAZOLE 20 MG/1
20 CAPSULE, DELAYED RELEASE ORAL 2 TIMES DAILY
Qty: 20 CAPSULE | Refills: 0 | Status: SHIPPED | OUTPATIENT
Start: 2018-12-18 | End: 2018-12-28

## 2018-12-18 NOTE — TELEPHONE ENCOUNTER
Notified pt that she was positive for HPylori and you have sent in a rx into her pharmacy that she needs to take as directed, and we will be in contact with her in 6 weeks to retesting. Pt verbalized understanding.

## 2018-12-26 RX ORDER — OMEPRAZOLE 20 MG/1
CAPSULE, DELAYED RELEASE ORAL
Qty: 20 CAPSULE | Refills: 0 | OUTPATIENT
Start: 2018-12-26

## 2018-12-27 DIAGNOSIS — R53.83 FATIGUE, UNSPECIFIED TYPE: ICD-10-CM

## 2018-12-27 DIAGNOSIS — R06.00 DYSPNEA, UNSPECIFIED TYPE: Primary | ICD-10-CM

## (undated) DEVICE — SINGLE-USE BIOPSY FORCEPS: Brand: RADIAL JAW 4

## (undated) DEVICE — THE BITE BLOCK MAXI, LATEX FREE STRAP IS USED TO PROTECT THE ENDOSCOPE INSERTION TUBE FROM BEING BITTEN BY THE PATIENT.

## (undated) DEVICE — Device: Brand: DEFENDO AIR/WATER/SUCTION AND BIOPSY VALVE